# Patient Record
Sex: MALE | Race: WHITE | NOT HISPANIC OR LATINO | Employment: UNEMPLOYED | URBAN - METROPOLITAN AREA
[De-identification: names, ages, dates, MRNs, and addresses within clinical notes are randomized per-mention and may not be internally consistent; named-entity substitution may affect disease eponyms.]

---

## 2021-11-22 ENCOUNTER — TELEPHONE (OUTPATIENT)
Dept: INTERNAL MEDICINE | Facility: CLINIC | Age: 21
End: 2021-11-22

## 2022-07-20 RX ORDER — CEFDINIR 300 MG/1
300 CAPSULE ORAL 2 TIMES DAILY
Qty: 20 CAPSULE | Refills: 0 | Status: SHIPPED | OUTPATIENT
Start: 2022-07-20 | End: 2022-07-30

## 2022-10-03 ENCOUNTER — TELEPHONE (OUTPATIENT)
Dept: INTERNAL MEDICINE | Facility: CLINIC | Age: 22
End: 2022-10-03

## 2022-10-03 ENCOUNTER — OFFICE VISIT (OUTPATIENT)
Dept: INTERNAL MEDICINE | Facility: CLINIC | Age: 22
End: 2022-10-03

## 2022-10-03 VITALS
HEIGHT: 68 IN | TEMPERATURE: 97.1 F | WEIGHT: 169 LBS | OXYGEN SATURATION: 98 % | BODY MASS INDEX: 25.61 KG/M2 | HEART RATE: 81 BPM | RESPIRATION RATE: 16 BRPM

## 2022-10-03 DIAGNOSIS — J02.9 ACUTE PHARYNGITIS, UNSPECIFIED ETIOLOGY: Primary | ICD-10-CM

## 2022-10-03 DIAGNOSIS — L50.9 URTICARIA: ICD-10-CM

## 2022-10-03 LAB
EXPIRATION DATE: NORMAL
INTERNAL CONTROL: NORMAL
Lab: NORMAL
S PYO RRNA THROAT QL PROBE: NEGATIVE

## 2022-10-03 PROCEDURE — 87651 STREP A DNA AMP PROBE: CPT | Performed by: INTERNAL MEDICINE

## 2022-10-03 PROCEDURE — 99214 OFFICE O/P EST MOD 30 MIN: CPT | Performed by: INTERNAL MEDICINE

## 2022-10-03 RX ORDER — AMOXICILLIN 500 MG/1
1000 CAPSULE ORAL 2 TIMES DAILY
Qty: 20 CAPSULE | Refills: 0 | Status: SHIPPED | OUTPATIENT
Start: 2022-10-03 | End: 2022-10-03 | Stop reason: SDUPTHER

## 2022-10-03 RX ORDER — AMOXICILLIN 500 MG/1
500 CAPSULE ORAL 2 TIMES DAILY
Qty: 20 CAPSULE | Refills: 0 | Status: SHIPPED | OUTPATIENT
Start: 2022-10-03 | End: 2022-10-13

## 2022-10-03 RX ORDER — AMOXICILLIN 500 MG/1
1000 CAPSULE ORAL 2 TIMES DAILY
Qty: 20 CAPSULE | Refills: 0 | Status: CANCELLED | OUTPATIENT
Start: 2022-10-03 | End: 2022-10-13

## 2022-10-03 NOTE — TELEPHONE ENCOUNTER
Caller: Antonio Huang    Relationship to patient: Self    Best call back number: [phone]    Patient is needing: PATIENT IS WAITING AT PHARMACY. MEDICATION HAS BEEN SENT TWICE TODAY. PHARMACY STILL TELLS PATIENT THEY HAVE NOT RECEIVED IT.   PLEASE ADVISE

## 2022-10-03 NOTE — TELEPHONE ENCOUNTER
PHARMACY STATES THEY DIDN'T RECEIVE THE ANTIBIOTIC THAT DR. MORGAN SENT TODAY. PLEASE ADVISE.     [facility] [id number] - [place], [place] - [address] AT [address] - [phone]  - [phone] [phone]

## 2022-10-03 NOTE — PROGRESS NOTES
"Chief Complaint  Sore Throat (Swollen, hard to swallow, ), Cough, and Nasal Congestion    Subjective        Antonio Huang presents to [facility]  History of Present Illness  PER HIS MY CHART  Started feeling bad Wednesday, September 28. Mainly sinus infection type symptoms. We tested him for Covid on Thursday. He has never had Covid. Thursday night he ate chicken noodle soup, crackers, ham and cheese sandwich,  chocolate chip little bites. Agnes Sun and Gingerale to drink.  He took Tylenol with dinner. Said his head started to itch and he took a shower/bath. He broke out into hives all over his body except his face and genitals. He was a little nauseous, and body was red all over. We gave him two Benadryl And I rubbed some cream on him to help with the itching. The next day which was Friday he went to the [facility] clinic because there were no appointments available, and I mainly wanted to get him tested for Covid again, and strep throat. Both of those test came back negative. they said it was a virus that was going around. He has continued to get worse and now his throat is swollen, and we tested him for Covid again last night and again the test was negative. Not sure if the Home ones are affective but he’s been tested three times now and all three times negative. He’s coming in to see you at 1:45 I do believe. I just wanted to make sure all of the symptoms that’s been going on he relays with you.  Patient's throat is still the main symptom due to its soreness.  He is also lost quite a bit of weight he felt like from lack of appetite  Objective   Vital Signs:  Pulse 81   Temp 97.1 °F (36.2 °C) (Temporal)   Resp 16   Ht 172.7 cm (68\")   Wt 76.7 kg (169 lb)   SpO2 98%   BMI 25.70 kg/m²   Estimated body mass index is 25.7 kg/m² as calculated from the following:    Height as of this encounter: 172.7 cm (68\").    Weight as of this encounter: 76.7 kg (169 " lb).          Physical Exam  Vitals and nursing note reviewed.   Constitutional:       Appearance: Normal appearance.   HENT:      Head: Normocephalic and atraumatic.      Right Ear: Tympanic membrane and ear canal normal.      Left Ear: Tympanic membrane normal.      Nose: Nose normal.      Mouth/Throat:      Tongue: No lesions. Tongue does not deviate from midline.      Palate: No mass and lesions.      Pharynx: Uvula midline. Posterior oropharyngeal erythema present.      Tonsils: No tonsillar abscesses.   Eyes:      Extraocular Movements: Extraocular movements intact.      Conjunctiva/sclera: Conjunctivae normal.      Pupils: Pupils are equal, round, and reactive to light.   Neck:      Thyroid: No thyroid mass.      Trachea: Trachea normal.      Meningeal: Brudzinski's sign and Kernig's sign absent.   Cardiovascular:      Rate and Rhythm: Normal rate.      Pulses: Normal pulses.      Heart sounds: Normal heart sounds. No murmur heard.    No friction rub. No gallop.   Pulmonary:      Effort: Pulmonary effort is normal. No respiratory distress.      Breath sounds: Normal breath sounds. No stridor. No wheezing, rhonchi or rales.   Abdominal:      General: Abdomen is flat.      Palpations: Abdomen is soft.   Musculoskeletal:      Cervical back: Neck supple. No rigidity.      Right lower leg: No edema.      Left lower leg: No edema.   Lymphadenopathy:      Cervical: Cervical adenopathy present.   Skin:     General: Skin is warm and dry.      Capillary Refill: Capillary refill takes less than 2 seconds.   Neurological:      General: No focal deficit present.      Mental Status: He is alert and oriented to person, place, and time.   Psychiatric:         Mood and Affect: Mood normal.        Result Review :                Assessment and Plan   Diagnoses and all orders for this visit:    1. Acute pharyngitis, unspecified etiology (Primary)  -     POCT Strep A, molecular  -     CBC & Differential  -     Mononucleosis  Screen    2. Urticaria    Other orders  -     amoxicillin (AMOXIL) 500 MG capsule; Take 2 capsules by mouth 2 (Two) Times a Day for 10 days.  Dispense: 20 capsule; Refill: 0    Pharyngitis likely nongroup a strep.  Mono certainly a possibility.  Its been about 4 to 5 days with the illness.  He had 3 negative COVID test.  Amoxicillin prescription sent in and check Monospot and CBC.  No hives currently but he is taking the Benadryl and allergy medicine.  I would continue on with that.  If breakthrough symptoms we can add oral steroids but he looks comfortable enough at this point.  Follow-up pending labs         Follow Up   No follow-ups on file.  Patient was given instructions and counseling regarding his condition or for health maintenance advice. Please see specific information pulled into the AVS if appropriate.

## 2022-10-04 LAB
BASOPHILS # BLD AUTO: 0.02 10*3/MM3 (ref 0–0.2)
BASOPHILS NFR BLD AUTO: 0.3 % (ref 0–1.5)
EOSINOPHIL # BLD AUTO: 0.08 10*3/MM3 (ref 0–0.4)
EOSINOPHIL NFR BLD AUTO: 1.1 % (ref 0.3–6.2)
ERYTHROCYTE [DISTWIDTH] IN BLOOD BY AUTOMATED COUNT: 11.9 % (ref 12.3–15.4)
HCT VFR BLD AUTO: 42 % (ref 37.5–51)
HETEROPH AB SER QL LA: NEGATIVE
HGB BLD-MCNC: 14.8 G/DL (ref 13–17.7)
IMM GRANULOCYTES # BLD AUTO: 0.01 10*3/MM3 (ref 0–0.05)
IMM GRANULOCYTES NFR BLD AUTO: 0.1 % (ref 0–0.5)
LYMPHOCYTES # BLD AUTO: 2.29 10*3/MM3 (ref 0.7–3.1)
LYMPHOCYTES NFR BLD AUTO: 31.2 % (ref 19.6–45.3)
MCH RBC QN AUTO: 31.8 PG (ref 26.6–33)
MCHC RBC AUTO-ENTMCNC: 35.2 G/DL (ref 31.5–35.7)
MCV RBC AUTO: 90.3 FL (ref 79–97)
MONOCYTES # BLD AUTO: 1 10*3/MM3 (ref 0.1–0.9)
MONOCYTES NFR BLD AUTO: 13.6 % (ref 5–12)
NEUTROPHILS # BLD AUTO: 3.94 10*3/MM3 (ref 1.7–7)
NEUTROPHILS NFR BLD AUTO: 53.7 % (ref 42.7–76)
NRBC BLD AUTO-RTO: 0 /100 WBC (ref 0–0.2)
PLATELET # BLD AUTO: 198 10*3/MM3 (ref 140–450)
RBC # BLD AUTO: 4.65 10*6/MM3 (ref 4.14–5.8)
WBC # BLD AUTO: 7.34 10*3/MM3 (ref 3.4–10.8)

## 2022-11-08 ENCOUNTER — OFFICE VISIT (OUTPATIENT)
Dept: INTERNAL MEDICINE | Facility: CLINIC | Age: 22
End: 2022-11-08

## 2022-11-08 VITALS
OXYGEN SATURATION: 98 % | HEIGHT: 68 IN | WEIGHT: 172 LBS | BODY MASS INDEX: 26.07 KG/M2 | RESPIRATION RATE: 16 BRPM | DIASTOLIC BLOOD PRESSURE: 70 MMHG | HEART RATE: 74 BPM | TEMPERATURE: 96.8 F | SYSTOLIC BLOOD PRESSURE: 122 MMHG

## 2022-11-08 DIAGNOSIS — J45.909 REACTIVE AIRWAY DISEASE WITHOUT COMPLICATION, UNSPECIFIED ASTHMA SEVERITY, UNSPECIFIED WHETHER PERSISTENT: Primary | ICD-10-CM

## 2022-11-08 PROCEDURE — 99214 OFFICE O/P EST MOD 30 MIN: CPT | Performed by: INTERNAL MEDICINE

## 2022-11-08 RX ORDER — FLUTICASONE PROPIONATE AND SALMETEROL 250; 50 UG/1; UG/1
1 POWDER RESPIRATORY (INHALATION)
Qty: 1 EACH | Refills: 1 | Status: SHIPPED | OUTPATIENT
Start: 2022-11-08

## 2022-11-08 RX ORDER — AZITHROMYCIN 250 MG/1
TABLET, FILM COATED ORAL
Qty: 6 TABLET | Refills: 0 | Status: SHIPPED | OUTPATIENT
Start: 2022-11-08 | End: 2023-04-04

## 2022-11-08 NOTE — PROGRESS NOTES
"Chief Complaint  Cough (X 2 weeks )    Subjective        Antonio Huang presents to [facility]  History of Present Illness  He was seen about 2 weeks ago for pharyngitis and put on amoxicillin.  Strep and mono screening negative.  Sore throat is better but the cough is persisted and probably gotten worse.  At times productive.  Worse with exercise and at night.  Objective   Vital Signs:  /70 (BP Location: Left arm, Patient Position: Sitting, Cuff Size: Large Adult)   Pulse 74   Temp 96.8 °F (36 °C) (Temporal)   Resp 16   Ht 172.7 cm (68\")   Wt 78 kg (172 lb)   SpO2 98%   BMI 26.15 kg/m²   Estimated body mass index is 26.15 kg/m² as calculated from the following:    Height as of this encounter: 172.7 cm (68\").    Weight as of this encounter: 78 kg (172 lb).          Physical Exam  Vitals and nursing note reviewed.   Constitutional:       Appearance: Normal appearance.   HENT:      Head: Normocephalic and atraumatic.   Cardiovascular:      Rate and Rhythm: Normal rate and regular rhythm.   Pulmonary:      Effort: Pulmonary effort is normal.      Breath sounds: Wheezing and rales present.      Comments: Rales in the right middle lobe and a few scattered wheezes  Abdominal:      General: Abdomen is flat.      Palpations: Abdomen is soft.   Musculoskeletal:         General: No swelling or deformity.      Cervical back: Neck supple.      Right lower leg: No edema.      Left lower leg: No edema.   Skin:     General: Skin is warm.      Capillary Refill: Capillary refill takes less than 2 seconds.      Findings: No rash.   Neurological:      General: No focal deficit present.      Mental Status: He is alert and oriented to person, place, and time.        Result Review :           Previous note reviewed     Assessment and Plan   Diagnoses and all orders for this visit:    1. Reactive airway disease without complication, unspecified asthma severity, unspecified " whether persistent (Primary)    Other orders  -     azithromycin (Zithromax Z-Gaudencio) 250 MG tablet; Take 2 tablets by mouth on day 1, then 1 tablet daily on days 2-5  Dispense: 6 tablet; Refill: 0  -     Fluticasone-Salmeterol (ADVAIR/WIXELA) 250-50 MCG/ACT DISKUS; Inhale 1 puff 2 (Two) Times a Day.  Dispense: 1 each; Refill: 1    Reactive airway disease.  He does have some faint crackles in the right middle lobe also.  Atopic history.  Zithromax prescription and Advair prescription given.  Recheck if no improvement in the next 3 to 4 days.  Chest x-ray if symptoms persist         Follow Up   Return in about 4 weeks (around 12/6/2022).  Patient was given instructions and counseling regarding his condition or for health maintenance advice. Please see specific information pulled into the AVS if appropriate.

## 2022-11-11 ENCOUNTER — HOSPITAL ENCOUNTER (OUTPATIENT)
Dept: GENERAL RADIOLOGY | Facility: HOSPITAL | Age: 22
Discharge: HOME | End: 2022-11-11
Admitting: INTERNAL MEDICINE

## 2022-11-11 DIAGNOSIS — J45.909 REACTIVE AIRWAY DISEASE WITHOUT COMPLICATION, UNSPECIFIED ASTHMA SEVERITY, UNSPECIFIED WHETHER PERSISTENT: ICD-10-CM

## 2022-11-11 DIAGNOSIS — J45.909 REACTIVE AIRWAY DISEASE WITHOUT COMPLICATION, UNSPECIFIED ASTHMA SEVERITY, UNSPECIFIED WHETHER PERSISTENT: Primary | ICD-10-CM

## 2022-11-11 PROCEDURE — 71046 X-RAY EXAM CHEST 2 VIEWS: CPT

## 2022-11-14 RX ORDER — BENZONATATE 100 MG/1
200 CAPSULE ORAL 3 TIMES DAILY PRN
Qty: 30 CAPSULE | Refills: 1 | Status: SHIPPED | OUTPATIENT
Start: 2022-11-14 | End: 2023-04-04

## 2023-04-04 ENCOUNTER — OFFICE VISIT (OUTPATIENT)
Dept: INTERNAL MEDICINE | Facility: CLINIC | Age: 23
End: 2023-04-04
Payer: COMMERCIAL

## 2023-04-04 VITALS
HEART RATE: 83 BPM | OXYGEN SATURATION: 100 % | RESPIRATION RATE: 18 BRPM | HEIGHT: 68 IN | BODY MASS INDEX: 26.73 KG/M2 | TEMPERATURE: 98.7 F | WEIGHT: 176.4 LBS

## 2023-04-04 DIAGNOSIS — B34.9 VIRAL ILLNESS: Primary | ICD-10-CM

## 2023-04-04 RX ORDER — AMOXICILLIN AND CLAVULANATE POTASSIUM 875; 125 MG/1; MG/1
1 TABLET, FILM COATED ORAL 2 TIMES DAILY
Qty: 10 TABLET | Refills: 0 | Status: SHIPPED | OUTPATIENT
Start: 2023-04-04 | End: 2023-04-09

## 2023-04-05 NOTE — PROGRESS NOTES
"Chief Complaint  Nasal Congestion, Cough (Productive cough yellow sputum ), and sinus pressure     Subjective        Antonio Huang presents to [facility]  History of Present Illness  Here with 3 days of nasal congestion, facial pressures, drainage; no fevers, is having post nasal drainage, cough; no sob      Objective   Vital Signs:  Pulse 83   Temp 98.7 °F (37.1 °C)   Resp 18   Ht 172.7 cm (68\")   Wt 80 kg (176 lb 6.4 oz)   SpO2 100%   BMI 26.82 kg/m²   Estimated body mass index is 26.82 kg/m² as calculated from the following:    Height as of this encounter: 172.7 cm (68\").    Weight as of this encounter: 80 kg (176 lb 6.4 oz).             Physical Exam  Vitals and nursing note reviewed.   Constitutional:       General: He is not in acute distress.     Appearance: Normal appearance.   HENT:      Head: Normocephalic and atraumatic.      Right Ear: External ear normal.      Left Ear: External ear normal.      Nose: Nose normal.      Mouth/Throat:      Mouth: Mucous membranes are moist.      Pharynx: Oropharynx is clear.   Eyes:      Extraocular Movements: Extraocular movements intact.      Conjunctiva/sclera: Conjunctivae normal.      Pupils: Pupils are equal, round, and reactive to light.   Cardiovascular:      Rate and Rhythm: Normal rate and regular rhythm.      Pulses: Normal pulses.      Heart sounds: Normal heart sounds. No murmur heard.    No gallop.   Pulmonary:      Effort: Pulmonary effort is normal.      Breath sounds: Normal breath sounds.   Abdominal:      General: Abdomen is flat. Bowel sounds are normal. There is no distension.      Palpations: Abdomen is soft. There is no mass.      Tenderness: There is no abdominal tenderness.   Musculoskeletal:         General: No swelling. Normal range of motion.      Cervical back: Normal range of motion and neck supple.   Skin:     General: Skin is warm and dry.      Findings: No rash.   Neurological:      " General: No focal deficit present.      Mental Status: He is alert and oriented to person, place, and time. Mental status is at baseline.   Psychiatric:         Mood and Affect: Mood normal.         Behavior: Behavior normal.        Result Review :                   Assessment and Plan   Diagnoses and all orders for this visit:    1. Viral illness (Primary)    Other orders  -     amoxicillin-clavulanate (Augmentin) 875-125 MG per tablet; Take 1 tablet by mouth 2 (Two) Times a Day for 5 days.  Dispense: 10 tablet; Refill: 0    - add flonase, zyrtec  - ok with augmentin if worsening, change in illness, fevers  - discussed risks/benefits/alternatives of meds/treatments  - rtc worsening, change in illness         Follow Up   No follow-ups on file.  Patient was given instructions and counseling regarding his condition or for health maintenance advice. Please see specific information pulled into the AVS if appropriate.

## 2023-06-16 ENCOUNTER — TELEPHONE (OUTPATIENT)
Dept: INTERNAL MEDICINE | Facility: CLINIC | Age: 23
End: 2023-06-16

## 2023-06-16 NOTE — TELEPHONE ENCOUNTER
Caller: Antonio Huang    Relationship to patient: Self    Best call back number:[phone]    Patient is needing: PATIENT ASKS FOR VACCINATION RECORDS FOR SCHOOL.  PLEASE CALL WHEN READY FOR

## 2023-06-16 NOTE — TELEPHONE ENCOUNTER
Caller: PATRIC CERDA    Relationship to patient: Father    Best call back number: [phone]    Patient is needing: FATHER STATES PATIENT NEEDS VACCINATION RECORDS TODAY 6-16-23.  PLEASE ADVISE

## 2024-05-22 ENCOUNTER — OFFICE VISIT (OUTPATIENT)
Dept: INTERNAL MEDICINE | Facility: CLINIC | Age: 24
End: 2024-05-22
Payer: COMMERCIAL

## 2024-05-22 VITALS
HEIGHT: 68 IN | RESPIRATION RATE: 16 BRPM | TEMPERATURE: 97.8 F | OXYGEN SATURATION: 95 % | BODY MASS INDEX: 26.83 KG/M2 | HEART RATE: 98 BPM | WEIGHT: 177 LBS

## 2024-05-22 DIAGNOSIS — J30.9 ALLERGIC RHINITIS, UNSPECIFIED SEASONALITY, UNSPECIFIED TRIGGER: Primary | ICD-10-CM

## 2024-05-22 DIAGNOSIS — J45.21 MILD INTERMITTENT ASTHMA WITH EXACERBATION: ICD-10-CM

## 2024-05-22 DIAGNOSIS — J06.9 ACUTE URI: ICD-10-CM

## 2024-05-22 LAB
EXPIRATION DATE: NORMAL
FLUAV AG UPPER RESP QL IA.RAPID: NOT DETECTED
FLUBV AG UPPER RESP QL IA.RAPID: NOT DETECTED
INTERNAL CONTROL: NORMAL
Lab: NORMAL
SARS-COV-2 AG UPPER RESP QL IA.RAPID: NOT DETECTED

## 2024-05-22 PROCEDURE — 99214 OFFICE O/P EST MOD 30 MIN: CPT | Performed by: INTERNAL MEDICINE

## 2024-05-22 PROCEDURE — 87428 SARSCOV & INF VIR A&B AG IA: CPT | Performed by: INTERNAL MEDICINE

## 2024-05-22 RX ORDER — ALBUTEROL SULFATE 90 UG/1
2 AEROSOL, METERED RESPIRATORY (INHALATION) EVERY 4 HOURS PRN
Qty: 8.5 G | Refills: 1 | Status: SHIPPED | OUTPATIENT
Start: 2024-05-22

## 2024-05-22 RX ORDER — CETIRIZINE HYDROCHLORIDE 10 MG/1
10 TABLET ORAL DAILY
COMMUNITY

## 2024-05-22 RX ORDER — FLUTICASONE PROPIONATE AND SALMETEROL 250; 50 UG/1; UG/1
1 POWDER RESPIRATORY (INHALATION)
Qty: 1 EACH | Refills: 1 | Status: SHIPPED | OUTPATIENT
Start: 2024-05-22

## 2024-05-22 RX ORDER — AZITHROMYCIN 250 MG/1
TABLET, FILM COATED ORAL
Qty: 6 TABLET | Refills: 0 | Status: SHIPPED | OUTPATIENT
Start: 2024-05-22

## 2024-05-22 NOTE — PROGRESS NOTES
"Chief Complaint  Cough (Chest congestion /X 1 week )    Subjective        Antonio Huang presents to [facility]  Cough      Congestion and cough for about a week.  He has a history of allergy and seasonal asthma.  Not taking any medication per se now.  No fever or chills.  No known ill exposures.  Productive cough at times  Objective   Vital Signs:  Pulse 98   Temp 97.8 °F (36.6 °C) (Temporal)   Resp 16   Ht 172.7 cm (68\")   Wt 80.3 kg (177 lb)   SpO2 95%   BMI 26.91 kg/m²   Estimated body mass index is 26.91 kg/m² as calculated from the following:    Height as of this encounter: 172.7 cm (68\").    Weight as of this encounter: 80.3 kg (177 lb).             Physical Exam  Constitutional:       Appearance: Normal appearance.   HENT:      Head: Normocephalic and atraumatic.      Right Ear: Tympanic membrane and ear canal normal.      Left Ear: Tympanic membrane and ear canal normal.      Nose: Congestion present.      Mouth/Throat:      Pharynx: Oropharynx is clear. No oropharyngeal exudate or posterior oropharyngeal erythema.   Eyes:      Pupils: Pupils are equal, round, and reactive to light.   Cardiovascular:      Rate and Rhythm: Normal rate and regular rhythm.      Pulses: Normal pulses.      Heart sounds: Normal heart sounds. No murmur heard.  Pulmonary:      Effort: Pulmonary effort is normal. No respiratory distress.      Breath sounds: Normal breath sounds. No stridor. No wheezing.   Abdominal:      General: Abdomen is flat.      Palpations: Abdomen is soft.   Musculoskeletal:      Cervical back: Normal range of motion and neck supple.      Right lower leg: No edema.      Left lower leg: No edema.   Skin:     General: Skin is warm and dry.      Capillary Refill: Capillary refill takes less than 2 seconds.   Neurological:      General: No focal deficit present.      Mental Status: He is alert and oriented to person, place, and time.        Result Review " :          Previous notes reviewed           Assessment and Plan     Diagnoses and all orders for this visit:    1. Allergic rhinitis, unspecified seasonality, unspecified trigger (Primary)    2. Mild intermittent asthma with exacerbation    3. Acute URI    Rhinitis and asthma with lower respiratory tract infection.  Zithromax, Wixela and albuterol prescription sent in.  Follow-up if no improvement.  Chest x-ray if symptoms persist         Follow Up     No follow-ups on file.  Patient was given instructions and counseling regarding his condition or for health maintenance advice. Please see specific information pulled into the AVS if appropriate.

## 2024-05-28 ENCOUNTER — HOSPITAL ENCOUNTER (OUTPATIENT)
Dept: GENERAL RADIOLOGY | Facility: HOSPITAL | Age: 24
Discharge: HOME | End: 2024-05-28
Admitting: INTERNAL MEDICINE
Payer: COMMERCIAL

## 2024-05-28 DIAGNOSIS — J45.21 MILD INTERMITTENT ASTHMA WITH EXACERBATION: ICD-10-CM

## 2024-05-28 PROCEDURE — 71046 X-RAY EXAM CHEST 2 VIEWS: CPT

## 2024-06-05 ENCOUNTER — OFFICE VISIT (OUTPATIENT)
Dept: INTERNAL MEDICINE | Facility: CLINIC | Age: 24
End: 2024-06-05
Payer: COMMERCIAL

## 2024-06-05 ENCOUNTER — TELEPHONE (OUTPATIENT)
Dept: INTERNAL MEDICINE | Facility: CLINIC | Age: 24
End: 2024-06-05

## 2024-06-05 VITALS
HEART RATE: 92 BPM | HEIGHT: 68 IN | OXYGEN SATURATION: 96 % | TEMPERATURE: 97.9 F | BODY MASS INDEX: 27.13 KG/M2 | RESPIRATION RATE: 16 BRPM | WEIGHT: 179 LBS

## 2024-06-05 DIAGNOSIS — J06.9 ACUTE URI: ICD-10-CM

## 2024-06-05 DIAGNOSIS — J45.21 MILD INTERMITTENT ASTHMA WITH EXACERBATION: Primary | ICD-10-CM

## 2024-06-05 PROCEDURE — 99214 OFFICE O/P EST MOD 30 MIN: CPT | Performed by: INTERNAL MEDICINE

## 2024-06-05 RX ORDER — PREDNISONE 20 MG/1
TABLET ORAL
Qty: 15 TABLET | Refills: 0 | Status: SHIPPED | OUTPATIENT
Start: 2024-06-05 | End: 2024-06-05 | Stop reason: SDUPTHER

## 2024-06-05 RX ORDER — HYDROCODONE POLISTIREX AND CHLORPHENIRAMINE POLISTIREX 10; 8 MG/5ML; MG/5ML
5 SUSPENSION, EXTENDED RELEASE ORAL EVERY 12 HOURS PRN
Qty: 60 ML | Refills: 0 | Status: SHIPPED | OUTPATIENT
Start: 2024-06-05

## 2024-06-05 RX ORDER — PREDNISONE 20 MG/1
TABLET ORAL
Qty: 15 TABLET | Refills: 0 | Status: SHIPPED | OUTPATIENT
Start: 2024-06-05 | End: 2024-06-15

## 2024-06-05 NOTE — PROGRESS NOTES
"Chief Complaint  Follow-up and Cough    Subjective        Antonio Huang presents to [facility]  Cough      Ongoing cough despite Wixela, Proventil and Zithromax.  He took some Robitussin AC last night and did not really feel like it helped much.  Paroxysms of cough for some time will lose his breath.  Posttussive emesis occasionally.  No fever or chills.  Chest x-ray was negative.  His father has a similar illness  Objective   Vital Signs:  Pulse 92   Temp 97.9 °F (36.6 °C) (Temporal)   Resp 16   Ht 172.7 cm (68\")   Wt 81.2 kg (179 lb)   SpO2 96%   BMI 27.22 kg/m²   Estimated body mass index is 27.22 kg/m² as calculated from the following:    Height as of this encounter: 172.7 cm (68\").    Weight as of this encounter: 81.2 kg (179 lb).             Physical Exam  Constitutional:       Appearance: Normal appearance.   HENT:      Head: Normocephalic and atraumatic.      Right Ear: Tympanic membrane and ear canal normal.      Left Ear: Tympanic membrane and ear canal normal.      Nose: Congestion present.      Mouth/Throat:      Pharynx: Oropharynx is clear. No oropharyngeal exudate or posterior oropharyngeal erythema.   Eyes:      Pupils: Pupils are equal, round, and reactive to light.   Cardiovascular:      Rate and Rhythm: Normal rate and regular rhythm.      Pulses: Normal pulses.      Heart sounds: Normal heart sounds. No murmur heard.  Pulmonary:      Effort: Pulmonary effort is normal. No respiratory distress.      Breath sounds: Normal breath sounds. No stridor. No wheezing.   Abdominal:      General: Abdomen is flat.      Palpations: Abdomen is soft.   Musculoskeletal:      Cervical back: Normal range of motion and neck supple.      Right lower leg: No edema.      Left lower leg: No edema.   Skin:     General: Skin is warm and dry.      Capillary Refill: Capillary refill takes less than 2 seconds.   Neurological:      General: No focal deficit present.    "   Mental Status: He is alert and oriented to person, place, and time.        Result Review :          Previous notes and x-ray reviewed           Assessment and Plan     Diagnoses and all orders for this visit:    1. Mild intermittent asthma with exacerbation (Primary)  -     Hydrocod Mauro-Chlorphe Mauro ER (TUSSIONEX PENNKINETIC) 10-8 MG/5ML ER suspension; Take 5 mL by mouth Every 12 (Twelve) Hours As Needed for Cough (cough).  Dispense: 60 mL; Refill: 0    2. Acute URI    Other orders  -     predniSONE (DELTASONE) 20 MG tablet; Take 2 tablets by mouth Daily for 5 days, THEN 2 tablets Daily for 5 days.  Dispense: 15 tablet; Refill: 0    Still severe coughing and paroxysms of cough with posttussive emesis and difficulty catching his breath.  Would continue the Wixela and albuterol every 4 hours on a schedule.  Prednisone prescription sent in Tussionex prescription sent in.  Follow-up if problems.         Follow Up     No follow-ups on file.  Patient was given instructions and counseling regarding his condition or for health maintenance advice. Please see specific information pulled into the AVS if appropriate.

## 2024-06-05 NOTE — TELEPHONE ENCOUNTER
"  Caller: Antonio Huang \"Dorian\"    Relationship: Self    Best call back number: [phone]    What medication are you requesting: predniSONE (DELTASONE) 20 MG tablet     Have you had these symptoms before:    [x] Yes  [] No    Have you been treated for these symptoms before:   [x] Yes  [] No    If a prescription is needed, what is your preferred pharmacy and phone number: [facility] [id number] - [facility] [place] - [address]  AT [address] [phone] [facility] [phone]      Additional notes: STATED THAT THEY WERE INFORMED BY THE PHARMACY SOT[facility] WENT TO  THE MEDICATIONS THAT WERE CALLED IN THAT THEY DO NOT HAVE THE PRESCRIPTION FOR THE PREDNISONE. PLEASE CALL AND ADVISE         "

## 2024-06-28 RX ORDER — BUDESONIDE AND FORMOTEROL FUMARATE DIHYDRATE 160; 4.5 UG/1; UG/1
2 AEROSOL RESPIRATORY (INHALATION)
Qty: 1 EACH | Refills: 12 | Status: SHIPPED | OUTPATIENT
Start: 2024-06-28 | End: 2024-06-28 | Stop reason: SDUPTHER

## 2024-06-28 RX ORDER — PREDNISONE 20 MG/1
20 TABLET ORAL DAILY
Qty: 10 TABLET | Refills: 1 | Status: SHIPPED | OUTPATIENT
Start: 2024-06-28

## 2024-06-28 RX ORDER — PREDNISONE 20 MG/1
20 TABLET ORAL DAILY
Qty: 10 TABLET | Refills: 1 | Status: SHIPPED | OUTPATIENT
Start: 2024-06-28 | End: 2024-06-28 | Stop reason: SDUPTHER

## 2024-06-28 RX ORDER — ALBUTEROL SULFATE 90 UG/1
2 AEROSOL, METERED RESPIRATORY (INHALATION) EVERY 4 HOURS PRN
Qty: 8.5 G | Refills: 1 | Status: SHIPPED | OUTPATIENT
Start: 2024-06-28

## 2024-06-28 RX ORDER — BUDESONIDE AND FORMOTEROL FUMARATE DIHYDRATE 160; 4.5 UG/1; UG/1
2 AEROSOL RESPIRATORY (INHALATION)
Qty: 1 EACH | Refills: 12 | Status: SHIPPED | OUTPATIENT
Start: 2024-06-28

## 2024-06-28 RX ORDER — ALBUTEROL SULFATE 90 UG/1
2 AEROSOL, METERED RESPIRATORY (INHALATION) EVERY 4 HOURS PRN
Qty: 8.5 G | Refills: 1 | Status: SHIPPED | OUTPATIENT
Start: 2024-06-28 | End: 2024-06-28 | Stop reason: SDUPTHER

## 2024-06-28 RX ORDER — INHALER, ASSIST DEVICES
1 SPACER (EA) MISCELLANEOUS 4 TIMES DAILY
Qty: 1 EACH | Refills: 1 | Status: SHIPPED | OUTPATIENT
Start: 2024-06-28 | End: 2024-06-28 | Stop reason: SDUPTHER

## 2024-06-28 RX ORDER — INHALER, ASSIST DEVICES
1 SPACER (EA) MISCELLANEOUS 4 TIMES DAILY
Qty: 1 EACH | Refills: 1 | Status: SHIPPED | OUTPATIENT
Start: 2024-06-28

## 2024-08-06 ENCOUNTER — OFFICE (OUTPATIENT)
Dept: URBAN - METROPOLITAN AREA CLINIC 76 | Facility: CLINIC | Age: 24
End: 2024-08-06
Payer: COMMERCIAL

## 2024-08-06 VITALS
HEART RATE: 68 BPM | WEIGHT: 182 LBS | SYSTOLIC BLOOD PRESSURE: 114 MMHG | HEIGHT: 68 IN | DIASTOLIC BLOOD PRESSURE: 76 MMHG

## 2024-08-06 DIAGNOSIS — F45.8 OTHER SOMATOFORM DISORDERS: ICD-10-CM

## 2024-08-06 PROCEDURE — 99204 OFFICE O/P NEW MOD 45 MIN: CPT

## 2024-08-15 ENCOUNTER — OFFICE (OUTPATIENT)
Age: 24
End: 2024-08-15
Payer: COMMERCIAL

## 2024-08-15 ENCOUNTER — AMBULATORY SURGICAL CENTER (OUTPATIENT)
Age: 24
End: 2024-08-15

## 2024-08-15 ENCOUNTER — OFFICE (OUTPATIENT)
Dept: URBAN - METROPOLITAN AREA PATHOLOGY 4 | Facility: PATHOLOGY | Age: 24
End: 2024-08-15
Payer: COMMERCIAL

## 2024-08-15 ENCOUNTER — AMBULATORY SURGICAL CENTER (OUTPATIENT)
Dept: URBAN - METROPOLITAN AREA SURGERY 20 | Facility: SURGERY | Age: 24
End: 2024-08-15

## 2024-08-15 VITALS — HEIGHT: 68 IN | HEIGHT: 68 IN | HEIGHT: 68 IN | HEIGHT: 68 IN | HEIGHT: 68 IN | HEIGHT: 68 IN | HEIGHT: 68 IN

## 2024-08-15 DIAGNOSIS — K22.89 OTHER SPECIFIED DISEASE OF ESOPHAGUS: ICD-10-CM

## 2024-08-15 DIAGNOSIS — K31.89 OTHER DISEASES OF STOMACH AND DUODENUM: ICD-10-CM

## 2024-08-15 DIAGNOSIS — K31.84 GASTROPARESIS: ICD-10-CM

## 2024-08-15 DIAGNOSIS — T18.2XXA FOREIGN BODY IN STOMACH, INITIAL ENCOUNTER: ICD-10-CM

## 2024-08-15 DIAGNOSIS — F45.8 OTHER SOMATOFORM DISORDERS: ICD-10-CM

## 2024-08-15 DIAGNOSIS — K21.00 GASTRO-ESOPHAGEAL REFLUX DISEASE WITH ESOPHAGITIS, WITHOUT B: ICD-10-CM

## 2024-08-15 PROBLEM — K22.9 DISEASE OF ESOPHAGUS, UNSPECIFIED: Status: ACTIVE | Noted: 2024-08-15

## 2024-08-15 LAB
GI HISTOLOGY: A. GASTRIC: (no result)
GI HISTOLOGY: PDF REPORT: (no result)
Lab: (no result)

## 2024-08-15 PROCEDURE — 88305 TISSUE EXAM BY PATHOLOGIST: CPT | Performed by: PATHOLOGY

## 2024-08-15 PROCEDURE — 43239 EGD BIOPSY SINGLE/MULTIPLE: CPT | Performed by: INTERNAL MEDICINE

## 2024-12-20 ENCOUNTER — OFFICE VISIT (OUTPATIENT)
Dept: INTERNAL MEDICINE | Facility: CLINIC | Age: 24
End: 2024-12-20
Payer: COMMERCIAL

## 2024-12-20 VITALS
WEIGHT: 181 LBS | SYSTOLIC BLOOD PRESSURE: 110 MMHG | BODY MASS INDEX: 27.43 KG/M2 | DIASTOLIC BLOOD PRESSURE: 78 MMHG | OXYGEN SATURATION: 96 % | HEIGHT: 68 IN | HEART RATE: 77 BPM

## 2024-12-20 DIAGNOSIS — R22.32 SKIN LUMP OF ARM, LEFT: Primary | ICD-10-CM

## 2024-12-20 PROCEDURE — 99213 OFFICE O/P EST LOW 20 MIN: CPT

## 2024-12-20 NOTE — PROGRESS NOTES
"Chief Complaint  Follow-up (Bump on left arm  its been there for few months )    Subjective        Antonio Huang presents to [facility]  History of Present Illness    Dorian presents with concerns about a lump on his left upper arm.  He first noticed it in August.  It has not changed in size.  Does have tenderness or pain randomly, and with working out while lifting/working the left bicep  Seems to feel more painful when dehydrated as well  Notices it \"flare up\" and become more solid feeling or more protruding at times. Sometimes occurs randomly but more so with working out  Never changes colors. Never has redness.  Sometimes feels tingling sensation down left arm that occurs randomly. Describes it as a similar feeling to when you hit your funny bone.    Objective   Vital Signs:  /78 (BP Location: Right arm, Patient Position: Sitting, Cuff Size: Adult)   Pulse 77   Ht 172.7 cm (68\")   Wt 82.1 kg (181 lb)   SpO2 96%   BMI 27.52 kg/m²   Estimated body mass index is 27.52 kg/m² as calculated from the following:    Height as of this encounter: 172.7 cm (68\").    Weight as of this encounter: 82.1 kg (181 lb).          Physical Exam  Constitutional:       Appearance: Normal appearance. He is normal weight. He is not ill-appearing, toxic-appearing or diaphoretic.   Pulmonary:      Effort: Pulmonary effort is normal.   Skin:     General: Skin is warm.      Comments: Small round soft mobile nontender lump under the skin on left upper anterior arm. No erythema, edema, or pain noted.   Neurological:      Mental Status: He is alert.      Motor: No weakness.      Gait: Gait normal.   Psychiatric:         Mood and Affect: Mood normal.         Behavior: Behavior normal.         Thought Content: Thought content normal.         Judgment: Judgment normal.        Result Review :  The following data was reviewed by: EUGENIO Chapman on 12/20/2024:    Data reviewed : " Reviewed patient's chart.           Assessment and Plan   Diagnoses and all orders for this visit:    1. Skin lump of arm, left (Primary)  -     US Nonvascular Extremity Limited; Future    Discussed possible etiologies such as a cyst or lipoma. Will get US for further evaluation.          Follow Up   Return if symptoms worsen or fail to improve.  Patient was given instructions and counseling regarding his condition or for health maintenance advice. Please see specific information pulled into the AVS if appropriate.

## 2025-01-03 ENCOUNTER — HOSPITAL ENCOUNTER (OUTPATIENT)
Dept: ULTRASOUND IMAGING | Facility: HOSPITAL | Age: 25
Discharge: HOME | End: 2025-01-03
Payer: COMMERCIAL

## 2025-01-03 DIAGNOSIS — R22.32 SKIN LUMP OF ARM, LEFT: ICD-10-CM

## 2025-01-03 PROCEDURE — 76882 US LMTD JT/FCL EVL NVASC XTR: CPT
